# Patient Record
Sex: MALE | Race: WHITE | Employment: OTHER | URBAN - METROPOLITAN AREA
[De-identification: names, ages, dates, MRNs, and addresses within clinical notes are randomized per-mention and may not be internally consistent; named-entity substitution may affect disease eponyms.]

---

## 2022-04-22 ENCOUNTER — TELEPHONE (OUTPATIENT)
Dept: SURGERY | Age: 77
End: 2022-04-22

## 2022-04-25 NOTE — PROGRESS NOTES
Andres Stewart   [address] [facility]  Dept: [phone]  Dept Fax: [phone]    Visit Date: 4/26/2022    HPI:     Patient is a 77 y.o.  male referred by Dr. Alexandre Boyer for melanoma on the right scalp. The lesion is approximately the size of a dime and has been present for 4-8weeks. It has recently not significantly changed in size. It has not been bleeding. The lesion has not been pruritic. Patient does not have any other lesions of concern. Biopsy of the lesion was positive for melanoma. No personal or family history of melanoma. Lana Lai does not have significant subcutaneous mass, swelling in neck, cough, abdominal pain, jaundice, blood in stools, headaches, recent neurological changes or bone pain to indicate any metastatic disease. States he is feeling well with no other complaints. Past Medical History:   Diagnosis Date    Basal cell carcinoma of scalp 2016    BPH (benign prostatic hyperplasia)     Colon polyps 2012    Diplopia 05/2018    MRI negative, followed by opthamology    Diverticulitis     [id number]    Essential hypertension, benign     Glaucoma        Past Surgical History:   Procedure Laterality Date    CARPAL TUNNEL RELEASE Right 05/01/2018    FINGER TRIGGER RELEASE  2019    KNEE ARTHROSCOPY Bilateral 1990    MOHS SURGERY N/A     SHOULDER ARTHROSCOPY Right 1987       Cancer-related family history is not on file. Social History:   Social History     Tobacco Use    Smoking status: Never Smoker    Smokeless tobacco: Never Used   Substance Use Topics    Alcohol use: Not on file     Comment: once in awhile      Tobacco cessation counseling provided as appropriate. REVIEW OF SYSTEMS:    Pertinent positives and negatives are mentioned in the HPI above. Otherwise, all other systems were reviewed and negative.       Objective:     Physical Exam   BP (!) 140/83   Pulse 83   Temp 97.3 °F (36.3 °C)   Ht 5' 9\" (1.753 m)   Wt 201 lb 12.8 oz (91.5 kg)   BMI 29.80 kg/m²   General:  Alert, oriented x 3. Cooperative. Skin: Skin color, texture, turgor normal. There is a biopsy site on right posterior scalp. There are no surrounding areas of nodularity or pigmentation. Other suspicious skin lesions: no.   Lymph nodes: Cervical, supraclavicular, and axillary nodes normal.   HENT:  Normocephalic, without obvious abnormality. Moist mucus membranes. No icterus. Lungs:  No respiratory distress. Heart:  S1, S2. Questionable S1 click. Regular rate and rhythm by peripheral pulses. Abdomen:   Soft, non-tender. No masses,  No organomegaly. Extremities: Extremities normal, atraumatic, no cyanosis or edema. Neurologic: Grossly intact. Psychiatric: Appropriate mood and thought process     Pathology reviewed:   4/14/22: Malignant melanoma in situ, lentigo maligna type       Assessment/Plan:   1. Melanoma in situ of right scalp: Reviewed pathology with patient and indications for surgical intervention. Discussed options of removing lesion in the office versus operating room. Reviewed surgical procedure and risks associated with surgery. Patient has a trip planned to [facility] next month and would like to plan treatment around his trip. Questionable S1 click noted on exam. Pt states he is very active - is traveling to [facility] for hiking- and that he is feeling well. Will have pre-op check with PCP on Thurs. , 4/28. IMamie DNP, APRN, am scribing for an in the presence of Dr. Vadim Aguirre. 4/26/2022, 2:59 PM    Electronically signed by JENNIFER Barrow - CNP on 4/26/2022 at 2:49 PM     I, Dr. Vadim Aguirre, personally performed the services described in this documentation as scribed by Mamie Cortés DNP, APRN, in my presence, and it is both accurate and complete.      Vadim Aguirre MD  Surgery Attending

## 2022-04-26 ENCOUNTER — OFFICE VISIT (OUTPATIENT)
Dept: SURGERY | Age: 77
End: 2022-04-26
Payer: MEDICARE

## 2022-04-26 ENCOUNTER — TELEPHONE (OUTPATIENT)
Dept: SURGERY | Age: 77
End: 2022-04-26

## 2022-04-26 VITALS
DIASTOLIC BLOOD PRESSURE: 83 MMHG | WEIGHT: 201.8 LBS | BODY MASS INDEX: 29.89 KG/M2 | HEART RATE: 83 BPM | SYSTOLIC BLOOD PRESSURE: 140 MMHG | HEIGHT: 69 IN | TEMPERATURE: 97.3 F

## 2022-04-26 DIAGNOSIS — D03.4 MELANOMA IN SITU OF SCALP (HCC): Primary | ICD-10-CM

## 2022-04-26 PROBLEM — M17.12 PRIMARY OSTEOARTHRITIS OF LEFT KNEE: Status: ACTIVE | Noted: 2018-07-10

## 2022-04-26 PROBLEM — M65.30 TRIGGERING OF DIGIT: Status: ACTIVE | Noted: 2017-08-22

## 2022-04-26 PROBLEM — M65.311 TRIGGER FINGER OF RIGHT THUMB: Status: ACTIVE | Noted: 2017-08-22

## 2022-04-26 PROBLEM — G56.01 CARPAL TUNNEL SYNDROME OF RIGHT WRIST: Status: ACTIVE | Noted: 2017-04-07

## 2022-04-26 PROBLEM — H53.2 DIPLOPIA: Status: ACTIVE | Noted: 2019-05-30

## 2022-04-26 PROBLEM — M15.9 PRIMARY OSTEOARTHRITIS INVOLVING MULTIPLE JOINTS: Status: ACTIVE | Noted: 2019-07-01

## 2022-04-26 PROBLEM — H50.22 HYPERTROPIA OF LEFT EYE: Status: ACTIVE | Noted: 2019-05-30

## 2022-04-26 PROCEDURE — 3017F COLORECTAL CA SCREEN DOC REV: CPT | Performed by: SURGERY

## 2022-04-26 PROCEDURE — G8427 DOCREV CUR MEDS BY ELIG CLIN: HCPCS | Performed by: SURGERY

## 2022-04-26 PROCEDURE — 99203 OFFICE O/P NEW LOW 30 MIN: CPT | Performed by: SURGERY

## 2022-04-26 PROCEDURE — 1123F ACP DISCUSS/DSCN MKR DOCD: CPT | Performed by: SURGERY

## 2022-04-26 PROCEDURE — G8419 CALC BMI OUT NRM PARAM NOF/U: HCPCS | Performed by: SURGERY

## 2022-04-26 PROCEDURE — 1036F TOBACCO NON-USER: CPT | Performed by: SURGERY

## 2022-04-26 RX ORDER — TRIAMTERENE AND HYDROCHLOROTHIAZIDE 37.5; 25 MG/1; MG/1
TABLET ORAL
COMMUNITY
Start: 2022-04-02 | End: 2022-04-26

## 2022-04-26 RX ORDER — GABAPENTIN 100 MG/1
100 CAPSULE ORAL NIGHTLY
COMMUNITY
Start: 2021-11-15 | End: 2022-04-26

## 2022-04-26 RX ORDER — LOVASTATIN 40 MG/1
40 TABLET ORAL NIGHTLY
COMMUNITY

## 2022-04-26 RX ORDER — LOVASTATIN 40 MG/1
TABLET ORAL
COMMUNITY
Start: 2017-02-21 | End: 2022-04-26

## 2022-04-26 RX ORDER — SULFAMETHOXAZOLE AND TRIMETHOPRIM 800; 160 MG/1; MG/1
1 TABLET ORAL 2 TIMES DAILY
Status: ON HOLD | COMMUNITY
Start: 2018-12-06 | End: 2022-04-29 | Stop reason: ALTCHOICE

## 2022-04-26 RX ORDER — TRIAMTERENE AND HYDROCHLOROTHIAZIDE 37.5; 25 MG/1; MG/1
TABLET ORAL
COMMUNITY
Start: 2017-03-14 | End: 2022-04-26

## 2022-04-26 RX ORDER — NAPROXEN 500 MG/1
500 TABLET ORAL
COMMUNITY
Start: 2019-04-18 | End: 2022-04-26

## 2022-04-26 RX ORDER — TRIAMTERENE AND HYDROCHLOROTHIAZIDE 37.5; 25 MG/1; MG/1
1 TABLET ORAL DAILY
COMMUNITY

## 2022-04-26 RX ORDER — FLUOROURACIL 50 MG/G
CREAM TOPICAL
COMMUNITY
Start: 2016-12-18

## 2022-04-26 RX ORDER — LOVASTATIN 40 MG/1
TABLET ORAL
COMMUNITY
Start: 2022-04-03 | End: 2022-04-26

## 2022-04-26 RX ORDER — PREDNISONE 10 MG/1
TABLET ORAL
Status: ON HOLD | COMMUNITY
Start: 2018-12-20 | End: 2022-04-29 | Stop reason: ALTCHOICE

## 2022-04-27 NOTE — PROGRESS NOTES
PRE-OP INSTRUCTIONS FOR THE SURGICAL PATIENT YOU ARE UNABLE TO MAKE CONTACT FOR AN INTERVIEW:        1. Follow all instructions provided to you from your surgeons office, including your ARRIVAL TIME. 2. Enter the MAIN entrance located on [address] and report to the desk. 3. Bring your insurance & photo ID with you. You may also be asked to pay a co-pay, as you may want to bring a check or credit card with you. 4. Leave all other valuables at home. 5. Arrange for someone to drive you home and be with you for the first 24 hours after discharge. 6. Bring your medication list with you day of surgery with doses and frequency listed (including over the counter medications)  7. You must contact your surgeon for Instructions regarding:              - ALL medication instructions, especially if taking blood thinners, aspirin, or diabetic medication.         -Bariatric patients call surgeon if on diabetic medications as some need to be stopped 1 week preop  - IF  there is a change in your physical condition such as a cold, fever, rash, cuts, sores or any other infection, especially near your surgical site. 8. A Pre-op History and Physical for surgery MUST be completed by your Physician or an Urgent Care within 30 days of your procedure date. Please bring a copy with you on the day of your procedure and along with any other testing performed. 9. DO NOT EAT ANYTHING eight hours prior to arrival for surgery. May have up to 8 ounces of water 4 hours prior to arrival for surgery. NOTE: ALL Gastric, Bariatric and Bowel surgery patients MUST follow their surgeon's instructions. 10. No gum, candy, mints, or ice chips day of procedure. 11. Please refrain from drinking alcohol the day before or day of your procedure. 12. Please do not smoke the day of your procedure. 13. Dress in loose, comfortable clothing appropriate for redressing after your procedure.  Do not wear jewelry (including body piercings), make-up, fingernail polish, lotion, powders or metal hairclips. 15. Contacts will need to be removed prior to surgery. You may want to bring your eye glasses to wear immediately before and after surgery. 14. Dentures will need to be removed before your procedure. 13. Bring cases for your glasses, contacts, dentures, or hearing aids to protect them while you are in surgery. 16. If you use a CPAP, please bring it with you on the day of your procedure. 17. Do not shave or wax for 72 hours prior to procedure near your operative site  18. FOR WOMAN OF CHILDBEARING AGE ONLY- please make sure we can collect a urine sample on arrival.     If you have further questions, you may contact your surgeon's office or us at [phone]     Left instructions on patient's voicemail.     Alisa Terrell RN.4/27/2022 .4:38 PM

## 2022-04-27 NOTE — PROGRESS NOTES
Place patient label inside box (if no patient label, complete below)  Name:  :  MR#:   Yusra Juarez / PROCEDURE  1. I (we), Regi Berkowitz (Patient Name) authorize Tara Agee MD (Provider / Randell Galindo) and/or such assistants as may be selected by him/her, to perform the following operation/procedure(s): WIDE EXCISION OF SCALP MELANOMA       Note: If unable to obtain consent prior to an emergent procedure, document the emergent reason in the medical record. This procedure has been explained to my (our) satisfaction and included in the explanation was:  A) The intended benefit, nature, and extent of the procedure to be performed;  B) The significant risks involved and the probability of success;  C) Alternative procedures and methods of treatment;  D) The dangers and probable consequences of such alternatives (including no procedure or treatment); E) The expected consequences of the procedure on my future health;  F) Whether other qualified individuals would be performing important surgical tasks and/or whether  would be present to advise or support the procedure. I (we) understand that there are other risks of infection and other serious complications in the pre-operative/procedural and postoperative/procedural stages of my (our) care. I (we) have asked all of the questions which I (we) thought were important in deciding whether or not to undergo treatment or diagnosis. These questions have been answered to my (our) satisfaction. I (we) understand that no assurance can be given that the procedure will be a success, and no guarantee or warranty of success has been given to me (us). 2. It has been explained to me (us) that during the course of the operation/procedure, unforeseen conditions may be revealed that necessitate extension of the original procedure(s) or different procedure(s) than those set forth in Paragraph 1.  I (we) authorize and request that the above-named physician, his/her assistants or his/her designees, perform procedures as necessary and desirable if deemed to be in my (our) best interest.     Revised 8/2/2021                                                                          Page 1 of 2                   3. I acknowledge that health care personnel may be observing this procedure for the purpose of medical education or other specified purposes as may be necessary as requested and/or approved by my (our) physician. 4. I (we) consent to the disposal by the hospital Pathologist of the removed tissue, parts or organs in accordance with hospital policy. 5. I do ____ do not ____ consent to the use of a local infiltration pain blocking agent that will be used by my provider/surgical provider to help alleviate pain during my procedure. 6. I do ____ do not ____ consent to an emergent blood transfusion in the case of a life-threatening situation that requires blood components to be administered. This consent is valid for 24 hours from the beginning of the procedure. 7. This patient does ____ or does not ____ currently have a DNR status/order. If DNR order is in place, obtain Addendum to the Surgical Consent for ALL Patients with a DNR Order to address olive-operative status for limited intervention or DNR suspension.      8. I have read and fully understand the above Consent for Operation/Procedure and that all blanks were completed before I signed the consent.   _____________________________       _____________________      ____/____am/pm  Signature of Patient or legal representative      Printed Name / Relationship            Date / Time   ____________________________       _____________________      ____/____am/pm  Witness to Signature                                    Printed Name                    Date / Time     If patient is unable to sign or is a minor, complete the following)  Patient is a minor, ____ years of age, or unable to sign because:   ______________________________________________________________________________________________    Kianaliaget Ritesh If a phone consent is obtained, consent will be documented by using two health care professionals, each affirming that the consenting party has no questions and gives consent for the procedure discussed with the physician/provider.   _____________________          ____________________       _____/_____am/pm   2nd witness to phone consent        Printed name           Date / Time    Informed Consent:  I have provided the explanation described above in section 1 to the patient and/or legal representative.  I have provided the patient and/or legal representative with an opportunity to ask any questions about the proposed operation/procedure.   ___________________________          ____________________         ____/____am/pm  Provider / Proceduralist                            Printed name            Date / Time  Revised 8/2/2021                                                                      Page 2 of 2

## 2022-04-29 ENCOUNTER — ANESTHESIA (OUTPATIENT)
Dept: OPERATING ROOM | Age: 77
End: 2022-04-29
Payer: MEDICARE

## 2022-04-29 ENCOUNTER — HOSPITAL ENCOUNTER (OUTPATIENT)
Age: 77
Setting detail: OUTPATIENT SURGERY
Discharge: HOME | End: 2022-04-29
Attending: SURGERY | Admitting: SURGERY
Payer: MEDICARE

## 2022-04-29 ENCOUNTER — ANESTHESIA EVENT (OUTPATIENT)
Dept: OPERATING ROOM | Age: 77
End: 2022-04-29
Payer: MEDICARE

## 2022-04-29 VITALS
TEMPERATURE: 96.9 F | HEART RATE: 59 BPM | BODY MASS INDEX: 29.18 KG/M2 | OXYGEN SATURATION: 93 % | DIASTOLIC BLOOD PRESSURE: 82 MMHG | HEIGHT: 69 IN | RESPIRATION RATE: 16 BRPM | SYSTOLIC BLOOD PRESSURE: 124 MMHG | WEIGHT: 197 LBS

## 2022-04-29 VITALS
TEMPERATURE: 95.2 F | DIASTOLIC BLOOD PRESSURE: 101 MMHG | OXYGEN SATURATION: 96 % | SYSTOLIC BLOOD PRESSURE: 166 MMHG | RESPIRATION RATE: 8 BRPM

## 2022-04-29 DIAGNOSIS — D03.4 MELANOMA IN SITU OF SCALP (HCC): ICD-10-CM

## 2022-04-29 PROCEDURE — 7100000000 HC PACU RECOVERY - FIRST 15 MIN: Performed by: SURGERY

## 2022-04-29 PROCEDURE — 2500000003 HC RX 250 WO HCPCS: Performed by: SURGERY

## 2022-04-29 PROCEDURE — 13121 CMPLX RPR S/A/L 2.6-7.5 CM: CPT | Performed by: SURGERY

## 2022-04-29 PROCEDURE — 6360000002 HC RX W HCPCS: Performed by: SURGERY

## 2022-04-29 PROCEDURE — 13122 CMPLX RPR S/A/L ADDL 5 CM/>: CPT | Performed by: SURGERY

## 2022-04-29 PROCEDURE — 2709999900 HC NON-CHARGEABLE SUPPLY: Performed by: SURGERY

## 2022-04-29 PROCEDURE — 3700000000 HC ANESTHESIA ATTENDED CARE: Performed by: SURGERY

## 2022-04-29 PROCEDURE — 3600000004 HC SURGERY LEVEL 4 BASE: Performed by: SURGERY

## 2022-04-29 PROCEDURE — 7100000011 HC PHASE II RECOVERY - ADDTL 15 MIN: Performed by: SURGERY

## 2022-04-29 PROCEDURE — 2500000003 HC RX 250 WO HCPCS: Performed by: NURSE ANESTHETIST, CERTIFIED REGISTERED

## 2022-04-29 PROCEDURE — 11646 EXC F/E/E/N/L MAL+MRG >4 CM: CPT | Performed by: SURGERY

## 2022-04-29 PROCEDURE — 2580000003 HC RX 258: Performed by: SURGERY

## 2022-04-29 PROCEDURE — 7100000010 HC PHASE II RECOVERY - FIRST 15 MIN: Performed by: SURGERY

## 2022-04-29 PROCEDURE — 6360000002 HC RX W HCPCS: Performed by: NURSE ANESTHETIST, CERTIFIED REGISTERED

## 2022-04-29 PROCEDURE — 3600000014 HC SURGERY LEVEL 4 ADDTL 15MIN: Performed by: SURGERY

## 2022-04-29 PROCEDURE — 88305 TISSUE EXAM BY PATHOLOGIST: CPT

## 2022-04-29 PROCEDURE — 3700000001 HC ADD 15 MINUTES (ANESTHESIA): Performed by: SURGERY

## 2022-04-29 PROCEDURE — 7100000001 HC PACU RECOVERY - ADDTL 15 MIN: Performed by: SURGERY

## 2022-04-29 PROCEDURE — 2580000003 HC RX 258: Performed by: ANESTHESIOLOGY

## 2022-04-29 RX ORDER — OXYCODONE HYDROCHLORIDE 5 MG/1
5 TABLET ORAL EVERY 6 HOURS PRN
Qty: 5 TABLET | Refills: 0 | Status: SHIPPED | OUTPATIENT
Start: 2022-04-29 | End: 2022-05-01

## 2022-04-29 RX ORDER — DOCUSATE SODIUM 100 MG/1
100 CAPSULE, LIQUID FILLED ORAL 2 TIMES DAILY PRN
Qty: 6 CAPSULE | Refills: 0 | Status: SHIPPED | OUTPATIENT
Start: 2022-04-29 | End: 2022-05-02

## 2022-04-29 RX ORDER — ONDANSETRON 2 MG/ML
INJECTION INTRAMUSCULAR; INTRAVENOUS PRN
Status: DISCONTINUED | OUTPATIENT
Start: 2022-04-29 | End: 2022-04-29 | Stop reason: SDUPTHER

## 2022-04-29 RX ORDER — SODIUM CHLORIDE 9 MG/ML
INJECTION, SOLUTION INTRAVENOUS PRN
Status: DISCONTINUED | OUTPATIENT
Start: 2022-04-29 | End: 2022-04-29 | Stop reason: HOSPADM

## 2022-04-29 RX ORDER — DEXAMETHASONE SODIUM PHOSPHATE 4 MG/ML
INJECTION, SOLUTION INTRA-ARTICULAR; INTRALESIONAL; INTRAMUSCULAR; INTRAVENOUS; SOFT TISSUE PRN
Status: DISCONTINUED | OUTPATIENT
Start: 2022-04-29 | End: 2022-04-29 | Stop reason: SDUPTHER

## 2022-04-29 RX ORDER — SODIUM CHLORIDE 0.9 % (FLUSH) 0.9 %
5-40 SYRINGE (ML) INJECTION EVERY 12 HOURS SCHEDULED
Status: DISCONTINUED | OUTPATIENT
Start: 2022-04-29 | End: 2022-04-29 | Stop reason: HOSPADM

## 2022-04-29 RX ORDER — LIDOCAINE HYDROCHLORIDE 10 MG/ML
1 INJECTION, SOLUTION EPIDURAL; INFILTRATION; INTRACAUDAL; PERINEURAL
Status: DISCONTINUED | OUTPATIENT
Start: 2022-04-29 | End: 2022-04-29 | Stop reason: HOSPADM

## 2022-04-29 RX ORDER — SODIUM CHLORIDE 9 MG/ML
25 INJECTION, SOLUTION INTRAVENOUS PRN
Status: DISCONTINUED | OUTPATIENT
Start: 2022-04-29 | End: 2022-04-29 | Stop reason: HOSPADM

## 2022-04-29 RX ORDER — LIDOCAINE HYDROCHLORIDE 20 MG/ML
INJECTION, SOLUTION INFILTRATION; PERINEURAL PRN
Status: DISCONTINUED | OUTPATIENT
Start: 2022-04-29 | End: 2022-04-29 | Stop reason: SDUPTHER

## 2022-04-29 RX ORDER — BUPIVACAINE HYDROCHLORIDE AND EPINEPHRINE 5; 5 MG/ML; UG/ML
INJECTION, SOLUTION EPIDURAL; INTRACAUDAL; PERINEURAL PRN
Status: DISCONTINUED | OUTPATIENT
Start: 2022-04-29 | End: 2022-04-29 | Stop reason: ALTCHOICE

## 2022-04-29 RX ORDER — SODIUM CHLORIDE 0.9 % (FLUSH) 0.9 %
5-40 SYRINGE (ML) INJECTION PRN
Status: DISCONTINUED | OUTPATIENT
Start: 2022-04-29 | End: 2022-04-29 | Stop reason: HOSPADM

## 2022-04-29 RX ORDER — HYDRALAZINE HYDROCHLORIDE 20 MG/ML
10 INJECTION INTRAMUSCULAR; INTRAVENOUS
Status: DISCONTINUED | OUTPATIENT
Start: 2022-04-29 | End: 2022-04-29 | Stop reason: HOSPADM

## 2022-04-29 RX ORDER — SODIUM CHLORIDE, SODIUM LACTATE, POTASSIUM CHLORIDE, CALCIUM CHLORIDE 600; 310; 30; 20 MG/100ML; MG/100ML; MG/100ML; MG/100ML
INJECTION, SOLUTION INTRAVENOUS CONTINUOUS
Status: DISCONTINUED | OUTPATIENT
Start: 2022-04-29 | End: 2022-04-29 | Stop reason: HOSPADM

## 2022-04-29 RX ORDER — GLYCOPYRROLATE 0.2 MG/ML
INJECTION INTRAMUSCULAR; INTRAVENOUS PRN
Status: DISCONTINUED | OUTPATIENT
Start: 2022-04-29 | End: 2022-04-29 | Stop reason: SDUPTHER

## 2022-04-29 RX ORDER — FAMOTIDINE 10 MG/ML
INJECTION, SOLUTION INTRAVENOUS PRN
Status: DISCONTINUED | OUTPATIENT
Start: 2022-04-29 | End: 2022-04-29 | Stop reason: SDUPTHER

## 2022-04-29 RX ORDER — ROCURONIUM BROMIDE 10 MG/ML
INJECTION, SOLUTION INTRAVENOUS PRN
Status: DISCONTINUED | OUTPATIENT
Start: 2022-04-29 | End: 2022-04-29 | Stop reason: SDUPTHER

## 2022-04-29 RX ORDER — PROPOFOL 10 MG/ML
INJECTION, EMULSION INTRAVENOUS PRN
Status: DISCONTINUED | OUTPATIENT
Start: 2022-04-29 | End: 2022-04-29 | Stop reason: SDUPTHER

## 2022-04-29 RX ORDER — SUCCINYLCHOLINE CHLORIDE 20 MG/ML
INJECTION INTRAMUSCULAR; INTRAVENOUS PRN
Status: DISCONTINUED | OUTPATIENT
Start: 2022-04-29 | End: 2022-04-29 | Stop reason: SDUPTHER

## 2022-04-29 RX ORDER — MEPERIDINE HYDROCHLORIDE 25 MG/ML
12.5 INJECTION INTRAMUSCULAR; INTRAVENOUS; SUBCUTANEOUS EVERY 5 MIN PRN
Status: DISCONTINUED | OUTPATIENT
Start: 2022-04-29 | End: 2022-04-29 | Stop reason: HOSPADM

## 2022-04-29 RX ORDER — FENTANYL CITRATE 50 UG/ML
INJECTION, SOLUTION INTRAMUSCULAR; INTRAVENOUS PRN
Status: DISCONTINUED | OUTPATIENT
Start: 2022-04-29 | End: 2022-04-29 | Stop reason: SDUPTHER

## 2022-04-29 RX ADMIN — FENTANYL CITRATE 25 MCG: 50 INJECTION, SOLUTION INTRAMUSCULAR; INTRAVENOUS at 09:06

## 2022-04-29 RX ADMIN — ROCURONIUM BROMIDE 10 MG: 10 INJECTION INTRAVENOUS at 09:05

## 2022-04-29 RX ADMIN — FAMOTIDINE 20 MG: 10 INJECTION, SOLUTION INTRAVENOUS at 08:58

## 2022-04-29 RX ADMIN — ONDANSETRON 4 MG: 2 INJECTION INTRAMUSCULAR; INTRAVENOUS at 09:24

## 2022-04-29 RX ADMIN — PHENYLEPHRINE HYDROCHLORIDE 100 MCG: 10 INJECTION, SOLUTION INTRAMUSCULAR; INTRAVENOUS; SUBCUTANEOUS at 09:42

## 2022-04-29 RX ADMIN — PROPOFOL 200 MG: 10 INJECTION, EMULSION INTRAVENOUS at 09:06

## 2022-04-29 RX ADMIN — FENTANYL CITRATE 25 MCG: 50 INJECTION, SOLUTION INTRAMUSCULAR; INTRAVENOUS at 09:28

## 2022-04-29 RX ADMIN — SUCCINYLCHOLINE CHLORIDE 200 MG: 20 INJECTION, SOLUTION INTRAMUSCULAR; INTRAVENOUS; PARENTERAL at 09:07

## 2022-04-29 RX ADMIN — GLYCOPYRROLATE 0.2 MG: 0.2 INJECTION INTRAMUSCULAR; INTRAVENOUS at 09:04

## 2022-04-29 RX ADMIN — LIDOCAINE HYDROCHLORIDE 100 MG: 20 INJECTION, SOLUTION INFILTRATION; PERINEURAL at 09:06

## 2022-04-29 RX ADMIN — CEFAZOLIN SODIUM 2000 MG: 10 INJECTION, POWDER, FOR SOLUTION INTRAVENOUS at 09:00

## 2022-04-29 RX ADMIN — PHENYLEPHRINE HYDROCHLORIDE 50 MCG: 10 INJECTION, SOLUTION INTRAMUSCULAR; INTRAVENOUS; SUBCUTANEOUS at 09:15

## 2022-04-29 RX ADMIN — PHENYLEPHRINE HYDROCHLORIDE 50 MCG: 10 INJECTION, SOLUTION INTRAMUSCULAR; INTRAVENOUS; SUBCUTANEOUS at 09:26

## 2022-04-29 RX ADMIN — PHENYLEPHRINE HYDROCHLORIDE 50 MCG: 10 INJECTION, SOLUTION INTRAMUSCULAR; INTRAVENOUS; SUBCUTANEOUS at 09:21

## 2022-04-29 RX ADMIN — PHENYLEPHRINE HYDROCHLORIDE 100 MCG: 10 INJECTION, SOLUTION INTRAMUSCULAR; INTRAVENOUS; SUBCUTANEOUS at 09:47

## 2022-04-29 RX ADMIN — PHENYLEPHRINE HYDROCHLORIDE 100 MCG: 10 INJECTION, SOLUTION INTRAMUSCULAR; INTRAVENOUS; SUBCUTANEOUS at 09:37

## 2022-04-29 RX ADMIN — PHENYLEPHRINE HYDROCHLORIDE 50 MCG: 10 INJECTION, SOLUTION INTRAMUSCULAR; INTRAVENOUS; SUBCUTANEOUS at 09:32

## 2022-04-29 RX ADMIN — DEXAMETHASONE SODIUM PHOSPHATE 4 MG: 4 INJECTION, SOLUTION INTRAMUSCULAR; INTRAVENOUS at 09:21

## 2022-04-29 RX ADMIN — SODIUM CHLORIDE, POTASSIUM CHLORIDE, SODIUM LACTATE AND CALCIUM CHLORIDE: 600; 310; 30; 20 INJECTION, SOLUTION INTRAVENOUS at 07:44

## 2022-04-29 ASSESSMENT — PULMONARY FUNCTION TESTS
PIF_VALUE: 0
PIF_VALUE: 18
PIF_VALUE: 3
PIF_VALUE: 4
PIF_VALUE: 18
PIF_VALUE: 1
PIF_VALUE: 18
PIF_VALUE: 18
PIF_VALUE: 2
PIF_VALUE: 17
PIF_VALUE: 19
PIF_VALUE: 2
PIF_VALUE: 18
PIF_VALUE: 4
PIF_VALUE: 18
PIF_VALUE: 18
PIF_VALUE: 2
PIF_VALUE: 18
PIF_VALUE: 18
PIF_VALUE: 5
PIF_VALUE: 19
PIF_VALUE: 18
PIF_VALUE: 17
PIF_VALUE: 18
PIF_VALUE: 19
PIF_VALUE: 31
PIF_VALUE: 18
PIF_VALUE: 17
PIF_VALUE: 18
PIF_VALUE: 23
PIF_VALUE: 17
PIF_VALUE: 18
PIF_VALUE: 17
PIF_VALUE: 3
PIF_VALUE: 18
PIF_VALUE: 8
PIF_VALUE: 17
PIF_VALUE: 17
PIF_VALUE: 18
PIF_VALUE: 11
PIF_VALUE: 18
PIF_VALUE: 17
PIF_VALUE: 17
PIF_VALUE: 3
PIF_VALUE: 18
PIF_VALUE: 2
PIF_VALUE: 17
PIF_VALUE: 11
PIF_VALUE: 17
PIF_VALUE: 18
PIF_VALUE: 0
PIF_VALUE: 17
PIF_VALUE: 18
PIF_VALUE: 31
PIF_VALUE: 20
PIF_VALUE: 17
PIF_VALUE: 18
PIF_VALUE: 18
PIF_VALUE: 10
PIF_VALUE: 25
PIF_VALUE: 18
PIF_VALUE: 1
PIF_VALUE: 18
PIF_VALUE: 2
PIF_VALUE: 18
PIF_VALUE: 18
PIF_VALUE: 17
PIF_VALUE: 4
PIF_VALUE: 8
PIF_VALUE: 18

## 2022-04-29 ASSESSMENT — PAIN SCALES - GENERAL
PAINLEVEL_OUTOF10: 0

## 2022-04-29 ASSESSMENT — PAIN - FUNCTIONAL ASSESSMENT: PAIN_FUNCTIONAL_ASSESSMENT: 0-10

## 2022-04-29 NOTE — ANESTHESIA PRE PROCEDURE
Department of Anesthesiology  Preprocedure Note       Name:  Joselin Powers   Age:  77 y.o.  :  1945                                          MRN:  [id number]         Date:  2022      Surgeon: Richard Garcia):  Damien Ramos MD    Procedure: Procedure(s):  WIDE EXCISION OF SCALP MELANOMA    Medications prior to admission:   Prior to Admission medications    Medication Sig Start Date End Date Taking?  Authorizing Provider   Coenzyme Q10 (COQ10 PO) Take by mouth daily   Yes Historical Provider, MD   fluorouracil (EFUDEX) 5 % cream Reported on 16   Historical Provider, MD Darin Ryan Serenoa repens, (SAW PALMETTO PO) daily    Historical Provider, MD   vitamin D-3 (CHOLECALCIFEROL) 125 MCG (5000 UT) TABS Take by mouth    Historical Provider, MD   triamterene-hydroCHLOROthiazide (MAXZIDE-25) 37.5-25 MG per tablet Take 1 tablet by mouth daily    Historical Provider, MD   sertraline (ZOLOFT) 50 MG tablet Take 50 mg by mouth daily    Historical Provider, MD   lovastatin (MEVACOR) 40 MG tablet Take 40 mg by mouth nightly    Historical Provider, MD       Current medications:    Current Facility-Administered Medications   Medication Dose Route Frequency Provider Last Rate Last Admin    ceFAZolin (ANCEF) 2000 mg in dextrose 5 % 50 mL IVPB  2,000 mg IntraVENous Once Emile Pereyra MD        lidocaine PF 1 % injection 1 mL  1 mL IntraDERmal Once PRN Annette Guerra MD        lactated ringers infusion   IntraVENous Continuous Annette Guerra MD        sodium chloride flush 0.9 % injection 5-40 mL  5-40 mL IntraVENous 2 times per day Annette Guerra MD        sodium chloride flush 0.9 % injection 5-40 mL  5-40 mL IntraVENous PRN Annette Guerra MD        0.9 % sodium chloride infusion   IntraVENous PRN Annette Guerra MD           Allergies:  No Known Allergies    Problem List:    Patient Active Problem List   Diagnosis Code    Carpal tunnel syndrome of right wrist G56.01    Diplopia H53.2    Essential hypertension I10    Hypercholesterolemia E78.00    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    Primary osteoarthritis involving multiple joints M89.49    Primary osteoarthritis of left knee M17.12    Seasonal affective disorder (HCC) F33.8    Sprain of medial collateral ligament of knee S83.419A    Trigger finger of right thumb M65.311    Triggering of digit M65.30       Past Medical History:        Diagnosis Date    Basal cell carcinoma of scalp 2016    BPH (benign prostatic hyperplasia)     Colon polyps 2012    Diplopia 05/2018    MRI negative, followed by opthamology    Diverticulitis     [id number]    Essential hypertension, benign     Glaucoma        Past Surgical History:        Procedure Laterality Date    CARPAL TUNNEL RELEASE Right 05/01/2018    FINGER TRIGGER RELEASE  2019    KNEE ARTHROSCOPY Bilateral 1990    MOHS SURGERY N/A     SHOULDER ARTHROSCOPY Right 1987       Social History:    Social History     Tobacco Use    Smoking status: Never Smoker    Smokeless tobacco: Never Used   Substance Use Topics    Alcohol use: Not on file     Comment: once in awhile                                Counseling given: Not Answered      Vital Signs (Current):   Vitals:    04/29/22 0719   BP: 131/83   Pulse: 64   Resp: 15   Temp: 96.8 °F (36 °C)   TempSrc: Temporal   SpO2: 95%   Weight: 197 lb (89.4 kg)   Height: 5' 9\" (1.753 m)                                              BP Readings from Last 3 Encounters:   04/29/22 131/83   04/26/22 (!) 140/83       NPO Status:                                                                                 BMI:   Wt Readings from Last 3 Encounters:   04/29/22 197 lb (89.4 kg)   04/26/22 201 lb 12.8 oz (91.5 kg)     Body mass index is 29.09 kg/m².     CBC: No results found for: WBC, RBC, HGB, HCT, MCV, RDW, PLT    CMP: No results found for: NA, K, CL, CO2, BUN, CREATININE, GFRAA, AGRATIO, LABGLOM, GLUCOSE, GLU, PROT, CALCIUM, BILITOT, ALKPHOS, AST, ALT    POC Tests: No results for input(s): POCGLU, POCNA, POCK, POCCL, POCBUN, POCHEMO, POCHCT in the last 72 hours. Coags: No results found for: PROTIME, INR, APTT    HCG (If Applicable): No results found for: PREGTESTUR, PREGSERUM, HCG, HCGQUANT     ABGs: No results found for: PHART, PO2ART, WHF5NER, WNY1NOY, BEART, H4SYJING     Type & Screen (If Applicable):  No results found for: LABABO, LABRH    Drug/Infectious Status (If Applicable):  No results found for: HIV, HEPCAB    COVID-19 Screening (If Applicable): No results found for: COVID19        Anesthesia Evaluation  Patient summary reviewed and Nursing notes reviewed no history of anesthetic complications:   Airway: Mallampati: II  TM distance: >3 FB   Neck ROM: full  Mouth opening: > = 3 FB Dental: normal exam         Pulmonary:normal exam                               Cardiovascular:  Exercise tolerance: good (>4 METS),   (+) hypertension:,         Rhythm: regular  Rate: normal           Beta Blocker:  Not on Beta Blocker         Neuro/Psych:   (+) psychiatric history: stable with treatmentdepression/anxiety    (-) neuromuscular disease           GI/Hepatic/Renal:             Endo/Other:                     Abdominal:             Vascular: Other Findings:             Anesthesia Plan      general     ASA 3       Induction: intravenous. MIPS: Postoperative opioids intended and Prophylactic antiemetics administered. Anesthetic plan and risks discussed with patient. Plan discussed with CRNA.                   Jeffery Cuello DO   4/29/2022

## 2022-04-29 NOTE — FLOWSHEET NOTE
During reassessment pt surgical incision looks edematous to writer. Dr. Jessica Lassiter called and made aware. Came to bedside and said ok to transfer to \[facility]\"" and will recheck him in \"two hours\".

## 2022-04-29 NOTE — PROGRESS NOTES
Pt alert and oriented x4. Consents signed and on chart. IV placed. IVF infusing. Denies questions at this time. Wife- Attila Medina will be back to [address] [facility] around 1000 according to pt.

## 2022-04-29 NOTE — PROGRESS NOTES
PACU Transfer to [facility]    Vitals:    04/29/22 1115   BP: 124/83   Pulse: 57   Resp: 15   Temp: 96.6 °F (35.9 °C)   SpO2: 93%         Intake/Output Summary (Last 24 hours) at 4/29/2022 1128  Last data filed at 4/29/2022 1128  Gross per 24 hour   Intake 1051.5 ml   Output 5 ml   Net 1046.5 ml       Pain assessment:  none  Pain Level: 0    Patient transferred to care of [facility] RN.    4/29/2022 11:28 AM

## 2022-04-29 NOTE — PROGRESS NOTES
Ambulatory Surgery/Procedure Discharge Note    Vitals:    04/29/22 1141   BP: 124/82   Pulse: 59   Resp: 16   Temp: 96.9 °F (36.1 °C)   SpO2: 93%       In: 1051.5 [P.O.:20; I.V.:1031.5]  Out: 5     Restroom use offered before discharge. yes    Pain assessment:  Pain Level: 0    Resident in to see and assess patient, no new orders ok to discharge patient. Patient and wife aware and agreeable to plan of care. Patient discharged to home/self care.  Patient discharged via wheel chair by transporter to waiting family/S.O.       4/29/2022 2:30 PM

## 2022-04-29 NOTE — PROGRESS NOTES
Ice bag on  Area distal to right ear and right cheek. Denies difficulty swallowing saliva or breathing. HOB 45 degrees. Wife with him. Slight puffiness and ecchymosis right cheek. No drainage from incision behind right ear. Slight puffiness and ecchymosis around left eye. Able to identify fingers and states vision is clear. Waiting for resident. 1245 No change in appearance of incision or swelling. Up dated family patient about when resident to arrive.

## 2022-04-29 NOTE — FLOWSHEET NOTE
Mandy Pierre in [facility] made aware that Dr. Kenny Reed wants to recheck pt in [facility].

## 2022-04-29 NOTE — H&P
Noemi Nava    [id number]    [facility] Same Day Surgery Update H & P  Department of General Surgery   Surgical Service   Pre-operative History and Physical  Last H & P within the last 30 days. DIAGNOSIS:   Melanoma in situ of scalp ([facility] [address] [D03.4]    Procedure(s):  WIDE EXCISION OF SCALP MELANOMA    History obtained from: Patient interview and EHR      HISTORY OF PRESENT ILLNESS:   Patient is a 77 y.o. male with a scalp lesion, which was first discovered approximately one month ago. Biopsy reveals melanoma in situ, and the patient presents today for the above procedure. Illness screening:  Patient denies recent fever, chills, worsening cough, or known sick exposure     Past Medical History:        Diagnosis Date    Basal cell carcinoma of scalp 2016    BPH (benign prostatic hyperplasia)     Colon polyps 2012    Diplopia 05/2018    MRI negative, followed by opthamology    Diverticulitis     [id number]    Essential hypertension, benign     Glaucoma     Melanoma ([facility] [address]     R scalp behind ear     Past Surgical History:        Procedure Laterality Date    CARPAL TUNNEL RELEASE Right 05/01/2018    FINGER TRIGGER RELEASE  2019    KNEE ARTHROSCOPY Bilateral 1990    MOHS SURGERY N/A     SHOULDER ARTHROSCOPY Right 1987       Medications Prior to Admission:      Prior to Admission medications    Medication Sig Start Date End Date Taking?  Authorizing Provider   Coenzyme Q10 (COQ10 PO) Take by mouth daily   Yes Historical Provider, MD   fluorouracil (EFUDEX) 5 % cream Reported on 5/11/2017 12/18/16   Historical Provider, MD Darin Ryan Serenoa repens, (SAW PALMETTO PO) daily    Historical Provider, MD   vitamin D-3 (CHOLECALCIFEROL) 125 MCG (5000 UT) TABS Take by mouth    Historical Provider, MD   triamterene-hydroCHLOROthiazide (MAXZIDE-25) 37.5-25 MG per tablet Take 1 tablet by mouth daily    Historical Provider, MD   sertraline (ZOLOFT) 50 MG tablet Take 50 mg by mouth daily    Historical Provider, MD   lovastatin (MEVACOR) 40 MG tablet Take 40 mg by mouth nightly    Historical Provider, MD         Allergies:  Patient has no known allergies. PHYSICAL EXAM:      /83   Pulse 64   Temp 96.8 °F (36 °C) (Temporal)   Resp 15   Ht 5' 9\" (1.753 m)   Wt 197 lb (89.4 kg)   SpO2 95%   BMI 29.09 kg/m²      Airway:  Airway patent with no audible stridor. Heart:  Regular rate and rhythm. No murmur noted. Lungs:  No increased work of breathing, good air exchange, clear to auscultation bilaterally, no crackles or wheezing. Abdomen:  Soft, non-distended, non-tender, no rebound tenderness or guarding, and no masses palpated. ASSESSMENT AND PLAN     Patient is a 77 y.o. male with above specified procedure planned. 1.  The patients history and physical was obtained and signed off by the pre-admission testing department. Patient seen and focused exam done today- no new changes since last physical exam on 4/28/2022.    2.  Access to ancillary services are available per request of the provider.     Santy Samano, APRN - CNP     4/29/2022

## 2022-04-29 NOTE — BRIEF OP NOTE
Brief Postoperative Note      Patient: Kalpana Parent  YOB: 1945  MRN: [id number]    Date of Procedure: 4/29/2022    Pre-Op Diagnosis: Melanoma in situ of scalp ([address] [D03.4]    Post-Op Diagnosis: Same       Procedure(s):  WIDE EXCISION OF SCALP MELANOMA    Surgeon(s):  Mark Mcallister MD    Assistant:  Resident: Marybel Baer DO    Anesthesia: General    Estimated Blood Loss (mL): Minimal    Complications: None    Specimens:   ID Type Source Tests Collected by Time Destination   A : RIGHT SCALP MELANOMA Tissue Tissue SURGICAL PATHOLOGY Mark Mcallister MD 4/29/2022 3823        Implants:  * No implants in log *      Drains: * No LDAs found *    Findings: excision of R scalp melanoma in situ    Electronically signed by Connor Davis DO on 4/29/2022 at 10:16 AM

## 2022-04-29 NOTE — PROGRESS NOTES
Report received, care assumed for patient. Patient arrived to [facility] rm 8. Patient is alert and oriented x4, respirations easy and non-labored, no distress noted. Patient denies any surgical pain or nausea. Patient with bruising surrounding left eye. Bruising and skin tear to right side of face near ear. Incision behind right ear is approximated and covered with prineo dressing - clean dry and intact. Ice pack in place at this time. Patient and wife aware bruising will be assessed by doctor before going home. Education completed with wife and pt, verbalized understanding of instructions. RX x2 to wife. 1200- Report given to [facility] RN at bedside.

## 2022-04-29 NOTE — PROGRESS NOTES
Patient admitted to PACU # 03 from OR at [address]   per Dr. Ernestina Mota. Attached to PACU monitoring system and report received from anesthesia provider. Patient was reported to need BP support during procedure. Patient drowsy on admission and denied pain. Pt brought out on nonrebreather mask at 15 L. Pt surgical incision c/d/i with prineo. Pt has small skin tear to R side of face by ear and bruising around the L eye per CRNA and noted on arrival to PACU. IVF infusing. Will continue to monitor.

## 2022-05-10 NOTE — PROGRESS NOTES
[facility]   [address] [facility] [place] [address],[facility]  Dept: [phone]  Dept Fax: [phone]      Visit Date: 5/13/2022    Subjective: Ezekiel Brito is a 77 y.o. 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 Reports he is doing well with no complications. Wound healing well, no pain. No Known Allergies  Past Medical History:   Diagnosis Date    Basal cell carcinoma of scalp 2016    BPH (benign prostatic hyperplasia)     Colon polyps 2012    Diplopia 05/2018    MRI negative, followed by opthamology    Diverticulitis     [id number]    Essential hypertension, benign     Glaucoma     Melanoma ([facility]     R scalp behind ear     Past Surgical History:   Procedure Laterality Date    CARPAL TUNNEL RELEASE Right 05/01/2018    FINGER TRIGGER RELEASE  2019    KNEE ARTHROSCOPY Bilateral 1990    MOHS SURGERY N/A     PRE-MALIGNANT / BENIGN SKIN LESION EXCISION N/A 4/29/2022    WIDE EXCISION OF SCALP MELANOMA performed by Paulette Snyder MD at [address] [place] [address] ARTHROSCOPY Right 1987     No family history on file. Objective:     BP (!) 157/86   Pulse 57   Temp 97.8 °F (36.6 °C)   Ht 5' 9\" (1.753 m)   Wt 202 lb 12.8 oz (92 kg)   BMI 29.95 kg/m²     General:  Alert, oriented x 3, cooperative. Skin: Skin color, texture, turgor normal. Mesh dressing removed from posterior right scalp incision. Incision with edges well approximated, no drainage, no erythema. Lymph nodes: Cervical, supraclavicular, and axillary nodes normal.   HENT:  Normocephalic, without obvious abnormality. Moist mucus membranes. No icterus. Lungs: No respiratory distress. Heart:  Regular rate and rhythm. No murmur. Abdomen: Soft, non-tender. No masses,  No organomegaly. Extremities: Extremities normal, atraumatic, no cyanosis or edema.    Neurologic: Grossly intact sensory and motor exam.   Psychiatric: Appropriate mood and thought process Path - FINAL DIAGNOSIS:   Skin, right scalp, wide excision:   -Residual melanoma in situ.   -Negative for invasion.   -Margins of resection are negative for malignancy (3.0 mm from the   closest anterior/superior margin). -Prior biopsy site changes identified. Assessment/Plan:   1. Melanoma of scalp: S/p wide local excision on 4/29/22. Doing well with no post-op complications. Pathology reviewed with patient- showed melanoma-in-situ with negative margins; copy provided to Mr. Sarah Maddox. Steri-strips applied to incision after mesh removed. Continue to follow with Dr. Carmen Hernandez. Instructed to call with any questions or concerns.          Electronically signed by JENNIFER Celis CNP on 5/13/2022 at 9:52 AM

## 2022-05-13 ENCOUNTER — OFFICE VISIT (OUTPATIENT)
Dept: SURGERY | Age: 77
End: 2022-05-13

## 2022-05-13 VITALS
TEMPERATURE: 97.8 F | HEART RATE: 57 BPM | SYSTOLIC BLOOD PRESSURE: 157 MMHG | HEIGHT: 69 IN | BODY MASS INDEX: 30.04 KG/M2 | DIASTOLIC BLOOD PRESSURE: 86 MMHG | WEIGHT: 202.8 LBS

## 2022-05-13 DIAGNOSIS — Z51.89 VISIT FOR WOUND CHECK: ICD-10-CM

## 2022-05-13 DIAGNOSIS — D03.4 MELANOMA IN SITU OF SCALP (HCC): Primary | ICD-10-CM

## 2022-05-13 DIAGNOSIS — Z09 POSTOP CHECK: ICD-10-CM

## 2022-05-13 PROCEDURE — 99024 POSTOP FOLLOW-UP VISIT: CPT | Performed by: NURSE PRACTITIONER

## 2022-05-13 NOTE — OP NOTE
Operative Note      Patient: Vishal Garcia  YOB: 1945  MRN: [id number]    Date of Procedure: 4/29/2022    Pre-Op Diagnosis: [facility] [D03.4]    Post-Op Diagnosis: Same       Procedure(s):  WIDE EXCISION OF SCALP MELANOMA    Surgeon(s):  David Hollingsworth MD    Assistant:   Resident: Nathen Ramírez DO    Anesthesia: Monitor Anesthesia Care    Estimated Blood Loss (mL): 10 ml    Complications: None    Specimens:   ID Type Source Tests Collected by Time Destination   A : RIGHT SCALP MELANOMA Tissue Tissue SURGICAL PATHOLOGY David Hollingsworth MD 4/29/2022 7532      INDICATIONS FOR OPERATION:  Discussed with patient about wide excision of the melanoma. The risks of surgery include infection, bleeding, and recurrence of the lesion were explained. Patient verbalized understanding of the risks and benefits and wishes to proceed. DETAILS OF PROCEDURE: Patient was identified in the preoperative area and brought to the operating room. Monitored anesthesia given. Operative site is prepped and draped in a sterile manner. Timeout procedure was performed confirming the procedure, site and administration of antibiotics. Lesion and margin marked for the excision. The lesion was measuring 2.2 cm x 0.8 cm. A margin of 1 cm was taken on all sides as biopsy margins were positive and were not very distinctive. and it was converted into an eyeball shape to allow for the closure. Local anesthesia infiltrated. Skin incised with scalpel. Incision deepened to subcutaneous tissues. Electrocautery used for further division of tissues. Skin along with subcutaneous fat until fascia excised and oriented with sutures. Specimen sent to pathology. Hemostasis checked and achieved. Because the defect size, flaps are raised on all sides with diathermy. Flaps are raised above the fascia. The undermining of wound was more than the width of the wound.  The wound was closed in layers with interrupted 2-0 Vicryl subcutaneous and 3-0 Vicryl deep dermal sutures. Skin was closed with 3-0 vicryl sutures. Length of complex closure was 9 cm. Derma flex with preneo placed over the closed incision. The patient tolerated the procedure well. Patient was awakened and transferred to the recovery room uneventfully. I was present for the entire procedure.     Flores Colin MD  Surgical Oncologist

## 2023-06-07 NOTE — ANESTHESIA POSTPROCEDURE EVALUATION
Department of Anesthesiology  Postprocedure Note    Patient: Nieves Wang  MRN: [id number]  [place]: 1945  Date of evaluation: 4/29/2022  Time:  10:57 AM     Procedure Summary     Date: 04/29/22 Room / Location: [address] [place] [address]    Anesthesia Start: 0902 Anesthesia Stop: 9995    Procedure: WIDE EXCISION OF SCALP MELANOMA (N/A ) Diagnosis:       Melanoma in situ of scalp ([address]      (Melanoma in situ of scalp ([address] [D03.4])    Surgeons: Aisha William MD Responsible Provider: Jeffery Cuello DO    Anesthesia Type: general ASA Status: 3          Anesthesia Type: general    Ricky Phase I: Ricky Score: 8    Ricky Phase II:      Last vitals: Reviewed and per EMR flowsheets.        Anesthesia Post Evaluation    Patient location during evaluation: PACU  Patient participation: complete - patient participated  Level of consciousness: awake  Pain score: 0  Airway patency: patent  Nausea & Vomiting: no nausea and no vomiting  Complications: no  Cardiovascular status: blood pressure returned to baseline  Respiratory status: acceptable  Hydration status: euvolemic  Multimodal analgesia pain management approach
no

## (undated) DEVICE — SPONGE GZ W4XL8IN COT WVN 12 PLY

## (undated) DEVICE — BLANKET WRM W40.2XL55.9IN IORT LO BODY + MISTRAL AIR

## (undated) DEVICE — E-Z CLEAN, NON-STICK, PTFE COATED, ELECTROSURGICAL BLADE ELECTRODE, MODIFIED EXTENDED INSULATION, 2.5 INCH (6.35 CM): Brand: MEGADYNE

## (undated) DEVICE — HEAD & NECK: Brand: MEDLINE INDUSTRIES, INC.

## (undated) DEVICE — TOWEL,OR,DSP,ST,BLUE,DLX,8/PK,10PK/CS: Brand: MEDLINE

## (undated) DEVICE — SYSTEM SKIN CLSR 22CM DERMBND PRINEO

## (undated) DEVICE — STRIP,CLOSURE,WOUND,MEDI-STRIP,1/2X4: Brand: MEDLINE

## (undated) DEVICE — GLOVE SURG SZ 7 L12IN FNGR THK75MIL WHT LTX POLYMER BEAD

## (undated) DEVICE — SYRINGE IRRIG 60ML SFT PLIABLE BLB EZ TO GRP 1 HND USE W/

## (undated) DEVICE — GLOVE SURG SZ 7 L12IN FNGR THK79MIL GRN LTX FREE

## (undated) DEVICE — SUTURE VCRL SZ 2-0 L18IN ABSRB VLT L26MM SH 1/2 CIR J775D

## (undated) DEVICE — ELECTRODE NDL L2.8IN COAT LO PWR SET EDGE

## (undated) DEVICE — TOWEL,STOP FLAG GOLD N-W: Brand: MEDLINE

## (undated) DEVICE — SUTURE VCRL SZ 3-0 L18IN ABSRB UD L26MM SH 1/2 CIR J864D

## (undated) DEVICE — SUTURE MCRYL SZ 4-0 L27IN ABSRB UD L19MM PS-2 1/2 CIR PRIM Y426H